# Patient Record
Sex: FEMALE | Race: WHITE | Employment: STUDENT | URBAN - METROPOLITAN AREA
[De-identification: names, ages, dates, MRNs, and addresses within clinical notes are randomized per-mention and may not be internally consistent; named-entity substitution may affect disease eponyms.]

---

## 2019-01-14 ENCOUNTER — HOSPITAL ENCOUNTER (EMERGENCY)
Facility: CLINIC | Age: 49
Discharge: HOME | End: 2019-01-14
Attending: PHYSICIAN ASSISTANT | Admitting: PHYSICIAN ASSISTANT
Payer: COMMERCIAL

## 2019-01-14 VITALS
SYSTOLIC BLOOD PRESSURE: 138 MMHG | DIASTOLIC BLOOD PRESSURE: 81 MMHG | HEART RATE: 74 BPM | BODY MASS INDEX: 22.73 KG/M2 | OXYGEN SATURATION: 98 % | TEMPERATURE: 98.3 F | WEIGHT: 150 LBS | HEIGHT: 68 IN | RESPIRATION RATE: 18 BRPM

## 2019-01-14 DIAGNOSIS — R07.0 THROAT PAIN: ICD-10-CM

## 2019-01-14 DIAGNOSIS — J45.909 ASTHMA, UNSPECIFIED ASTHMA SEVERITY, UNSPECIFIED WHETHER COMPLICATED, UNSPECIFIED WHETHER PERSISTENT: ICD-10-CM

## 2019-01-14 DIAGNOSIS — J06.9 VIRAL URI WITH COUGH: ICD-10-CM

## 2019-01-14 PROCEDURE — 99203 OFFICE O/P NEW LOW 30 MIN: CPT | Mod: Z6 | Performed by: PHYSICIAN ASSISTANT

## 2019-01-14 PROCEDURE — G0463 HOSPITAL OUTPT CLINIC VISIT: HCPCS | Performed by: PHYSICIAN ASSISTANT

## 2019-01-14 RX ORDER — CLONAZEPAM 1 MG/1
1 TABLET ORAL 2 TIMES DAILY PRN
COMMUNITY

## 2019-01-14 RX ORDER — METHOCARBAMOL 500 MG/1
500 TABLET, FILM COATED ORAL 4 TIMES DAILY PRN
COMMUNITY

## 2019-01-14 RX ORDER — VENLAFAXINE 25 MG/1
25 TABLET ORAL 3 TIMES DAILY
COMMUNITY

## 2019-01-14 RX ORDER — ALBUTEROL SULFATE 90 UG/1
2 AEROSOL, METERED RESPIRATORY (INHALATION) EVERY 6 HOURS
COMMUNITY

## 2019-01-14 RX ORDER — PREDNISONE 20 MG/1
TABLET ORAL
Qty: 10 TABLET | Refills: 0 | Status: SHIPPED | OUTPATIENT
Start: 2019-01-14 | End: 2019-01-21

## 2019-01-14 SDOH — HEALTH STABILITY: MENTAL HEALTH: HOW OFTEN DO YOU HAVE A DRINK CONTAINING ALCOHOL?: NEVER

## 2019-01-14 ASSESSMENT — ENCOUNTER SYMPTOMS
COUGH: 1
BACK PAIN: 1
TROUBLE SWALLOWING: 0
PALPITATIONS: 0
VOICE CHANGE: 1
ABDOMINAL PAIN: 0
WHEEZING: 1
NECK PAIN: 1
FEVER: 0
VOMITING: 0
RHINORRHEA: 1
NAUSEA: 0
NECK STIFFNESS: 0
SORE THROAT: 1
MYALGIAS: 1
SHORTNESS OF BREATH: 0
HEADACHES: 0

## 2019-01-14 ASSESSMENT — MIFFLIN-ST. JEOR: SCORE: 1358.9

## 2019-01-14 NOTE — ED PROVIDER NOTES
History     Chief Complaint   Patient presents with     URI     HPI  Fernanda Jernigan is a 48 year old female who presents to the urgent care with     ENT Symptoms             Symptoms: cc Present Absent Comment   Fever/Chills   x    Fatigue  x     Muscle Aches  x     Eye Irritation   x    Sneezing   x    Nasal Jus/Drg  x     Sinus Pressure/Pain   x    Loss of smell   x    Dental pain   x    Sore Throat  x     Swollen Glands   x    Ear Pain/Fullness   x    Cough  x     Wheeze  x  But patient has been using her inhaler daily which helps.    Chest Pain   x    Shortness of breath   x    Rash   x    Other   x  Headache, body aches, and neck ache.      Symptom duration:  yesterday   Symptom severity:  mild    Treatments tried:  over the counter medication, inhaler, and tyelnol.    Contacts:  none known currently.      Patient with history of asthma. Patient states she had similar symptoms at the end of December which took 2 weeks to resolve. Patient had tonsillectomy years ago and states she never gets strep throat. Patient states she wants an antibiotic.    Problem list, Medication list, Allergies, and Medical/Social/Surgical histories reviewed in [employer] and updated as appropriate.    Problem List:    There are no active problems to display for this patient.       Past Medical History:    History reviewed. No pertinent past medical history.    Past Surgical History:    History reviewed. No pertinent surgical history.    Family History:    History reviewed. No pertinent family history.    Social History:  Marital Status:    Social History     Tobacco Use     Smoking status: Never Smoker     Smokeless tobacco: Never Used   Substance Use Topics     Alcohol use: No     Frequency: Never     Drug use: No        Medications:      albuterol (PROAIR HFA/PROVENTIL HFA/VENTOLIN HFA) 108 (90 Base) MCG/ACT inhaler   clonazePAM (KLONOPIN) 1 MG tablet   fluticasone-salmeterol (ADVAIR) 250-50 MCG/DOSE inhaler   methocarbamol (ROBAXIN) 500  "MG tablet   predniSONE (DELTASONE) 20 MG tablet   venlafaxine (EFFEXOR) 25 MG tablet         Review of Systems   Constitutional: Negative for fever.   HENT: Positive for congestion, rhinorrhea, sore throat and voice change (hoarse voice. ). Negative for trouble swallowing.    Respiratory: Positive for cough and wheezing (occasional ). Negative for shortness of breath.    Cardiovascular: Negative for chest pain and palpitations.   Gastrointestinal: Negative for abdominal pain, nausea and vomiting.   Musculoskeletal: Positive for back pain, myalgias and neck pain. Negative for neck stiffness.   Skin: Negative for rash.   Neurological: Negative for headaches.   All other systems reviewed and are negative.      Physical Exam   BP: 138/81  Pulse: 74  Temp: 98.3  F (36.8  C)  Resp: 18  Height: 172.7 cm (5' 8\")  Weight: 68 kg (150 lb)  SpO2: 98 %      Physical Exam   Constitutional: She is oriented to person, place, and time. Vital signs are normal. She appears well-developed and well-nourished. She is active and cooperative. No distress.   HENT:   Head: Normocephalic and atraumatic.   Right Ear: Tympanic membrane and ear canal normal.   Left Ear: Tympanic membrane and ear canal normal.   Nose: Rhinorrhea present.   Mouth/Throat: Uvula is midline, oropharynx is clear and moist and mucous membranes are normal. No oropharyngeal exudate, posterior oropharyngeal edema, posterior oropharyngeal erythema or tonsillar abscesses.   Neck: Trachea normal, normal range of motion, full passive range of motion without pain and phonation normal. Neck supple. No neck rigidity. No tracheal deviation, no erythema and normal range of motion present.   No meningeal signs.    Cardiovascular: Normal rate, regular rhythm and normal heart sounds.   No murmur heard.  Pulmonary/Chest: Effort normal. No stridor. No respiratory distress. She has wheezes (occasional wheeze noted throughout lungs, but inconsistent. ). She has no rales. She exhibits no " tenderness.   Abdominal: Soft. Bowel sounds are normal.   Lymphadenopathy:     She has cervical adenopathy.   Neurological: She is alert and oriented to person, place, and time.   Skin: Skin is warm. She is not diaphoretic.   Psychiatric: She has a normal mood and affect. Her behavior is normal. Judgment and thought content normal.   Vitals reviewed.      ED Course        Procedures              Critical Care time:  none               No results found for this or any previous visit (from the past 24 hour(s)).    Medications - No data to display    Assessments & Plan (with Medical Decision Making)     I have reviewed the nursing notes.    I have reviewed the findings, diagnosis, plan and need for follow up with the patient.   48-year-old female presents the urgent care with 1 day history of URI type symptoms with cough.  Patient does have a history of asthma no history of tobacco use in the past.  On exam findings of rhinorrhea, wheezing occasionally noted, but inconsistent and cervical lymphadenopathy appreciated.  Remainder of exam is unremarkable.  No indication for chest x-ray at this time no indication for rapid strep test.  Patient was stating that she wanted an antibiotic but I was able to discuss with her that at this time symptoms appear viral and due to her history of asthma and using her inhaler daily that I think prednisone would be a little bit more beneficial for symptom treatment.  Patient agrees and 5 days of oral prednisone given to patient for symptoms.  No concerns at this time for strep, pneumonia.  Patient to follow-up with primary care doctor in a few days if symptoms persist or fail to improve.  Patient to return if symptoms worsen or change.       Medication List      Started    predniSONE 20 MG tablet  Commonly known as:  DELTASONE  Take two tablets (= 40mg) each day for 5 (five) days            Final diagnoses:   Viral URI with cough   Throat pain   Asthma, unspecified asthma severity,  unspecified whether complicated, unspecified whether persistent       1/14/2019   [facility] EMERGENCY DEPARTMENT     Tiffanie Wells PA-C  01/14/19 [id number]

## 2019-01-14 NOTE — DISCHARGE INSTRUCTIONS
Use Medication as directed; no antibiotics indicated at this time.     Hydrate with fluids, rest, cool humidifier.  May use acetaminophen, ibuprofen prn.    For your Cough   The Buckwheat Honey Dose: Given   hour Prior to Bedtime  For children age under 1 year -Do not use due to botulism risk    2-5 years -  tsp (2.5 mL)   6-11 years -1 tsp (5 mL)   12-18 years -2 tsp (10 mL)     Guaifenesin    Adult dose -Not to exceed 2.4 g (2400mg) per day   Child age 6-12 years -100 mg every 4 hr, not to exceed 1.2 g (1200mg) per day    Pediatric, 2-6 years -50 mg every 4 hr as needed, not to exceed 600 mg per day    Cough medications is not recommended in children under 2 years.  With use of cough medications have combination medications be aware of products in the cough medication you are using to avoid overdose    Follow up with PCP in 5 days.    Go to Emergency Room if sx worsen or change, Shortness of breath, chest pain, persistent fevers, or painful breathing occur.     Patient voiced understanding of instructions given.

## 2019-01-14 NOTE — ED AVS SNAPSHOT
[facility]  [address] [address]  Phone:  [phone]  Fax:  [phone]                                    Fernanda Jernigan   MRN: [id number]    Department:  [facility]   Date of Visit:  1/14/2019           After Visit Summary Signature Page    I have received my discharge instructions, and my questions have been answered. I have discussed any challenges I see with this plan with the nurse or doctor.    ..........................................................................................................................................  Patient/Patient Representative Signature      ..........................................................................................................................................  Patient Representative Print Name and Relationship to Patient    ..................................................               ................................................  Date                                   Time    ..........................................................................................................................................  Reviewed by Signature/Title    ...................................................              ..............................................  Date                                               Time          22EPIC Rev 08/18